# Patient Record
Sex: MALE | Race: WHITE | NOT HISPANIC OR LATINO | Employment: OTHER | URBAN - METROPOLITAN AREA
[De-identification: names, ages, dates, MRNs, and addresses within clinical notes are randomized per-mention and may not be internally consistent; named-entity substitution may affect disease eponyms.]

---

## 2023-10-19 PROBLEM — L30.9 DERMATITIS, UNSPECIFIED: Status: ACTIVE | Noted: 2023-05-11

## 2023-10-19 PROBLEM — M54.16 LUMBAR RADICULITIS: Status: ACTIVE | Noted: 2023-10-19

## 2023-10-19 PROBLEM — L81.4 OTHER MELANIN HYPERPIGMENTATION: Status: ACTIVE | Noted: 2023-05-11

## 2023-10-19 PROBLEM — M51.379 DEGENERATION OF INTERVERTEBRAL DISC OF LUMBOSACRAL REGION: Status: ACTIVE | Noted: 2023-10-19

## 2023-10-19 PROBLEM — M99.09 SEGMENTAL AND SOMATIC DYSFUNCTION: Status: ACTIVE | Noted: 2023-10-19

## 2023-10-19 PROBLEM — L57.0 ACTINIC KERATOSIS: Status: ACTIVE | Noted: 2023-05-11

## 2023-10-19 PROBLEM — D22.5 MELANOCYTIC NEVI OF TRUNK: Status: ACTIVE | Noted: 2023-05-11

## 2023-10-19 PROBLEM — M51.16 LUMBAR DISC DISEASE WITH RADICULOPATHY: Status: ACTIVE | Noted: 2023-10-19

## 2023-10-19 PROBLEM — D23.5 OTHER BENIGN NEOPLASM OF SKIN OF TRUNK: Status: ACTIVE | Noted: 2023-05-11

## 2023-10-19 PROBLEM — D22.62 MELANOCYTIC NEVI OF LEFT UPPER LIMB, INCLUDING SHOULDER: Status: ACTIVE | Noted: 2023-05-11

## 2023-10-19 PROBLEM — M51.37 DEGENERATION OF INTERVERTEBRAL DISC OF LUMBOSACRAL REGION: Status: ACTIVE | Noted: 2023-10-19

## 2023-10-19 PROBLEM — C44.329 SQUAMOUS CELL CARCINOMA OF SKIN OF OTHER PARTS OF FACE: Status: ACTIVE | Noted: 2023-05-11

## 2023-10-19 PROBLEM — L21.9 SEBORRHEIC DERMATITIS, UNSPECIFIED: Status: ACTIVE | Noted: 2023-05-11

## 2023-10-19 PROBLEM — L71.9 ROSACEA: Status: ACTIVE | Noted: 2023-05-11

## 2023-10-19 PROBLEM — L82.1 OTHER SEBORRHEIC KERATOSIS: Status: ACTIVE | Noted: 2023-05-11

## 2023-10-19 PROBLEM — L82.0 INFLAMED SEBORRHEIC KERATOSIS: Status: ACTIVE | Noted: 2023-05-11

## 2023-10-19 RX ORDER — AMMONIUM LACTATE 12 G/100G
LOTION TOPICAL
COMMUNITY
Start: 2022-11-14 | End: 2023-10-23 | Stop reason: SDUPTHER

## 2023-10-19 RX ORDER — KETOCONAZOLE 20 MG/G
CREAM TOPICAL
COMMUNITY
Start: 2019-01-25

## 2023-10-19 RX ORDER — FLUOCINONIDE 0.5 MG/G
CREAM TOPICAL
COMMUNITY
Start: 2019-04-18 | End: 2023-10-23 | Stop reason: SDUPTHER

## 2023-10-19 RX ORDER — CHOLECALCIFEROL (VITAMIN D3) 125 MCG
1 TABLET ORAL DAILY
COMMUNITY

## 2023-10-19 RX ORDER — CALCIPOTRIENE 0.005% / CLOBETASOL PROPIONATE 0.05% .005; .05 G/100G; G/100G
SOLUTION TOPICAL
COMMUNITY
Start: 2019-12-09

## 2023-10-19 RX ORDER — MAGNESIUM 250 MG
1 TABLET ORAL DAILY
COMMUNITY

## 2023-10-19 RX ORDER — ASPIRIN 81 MG/1
1 TABLET ORAL DAILY
COMMUNITY

## 2023-10-19 RX ORDER — CLOBETASOL PROPIONATE 0.05 G/ML
SPRAY TOPICAL
COMMUNITY
Start: 2016-08-26

## 2023-10-19 RX ORDER — BISOPROLOL FUMARATE AND HYDROCHLOROTHIAZIDE 10; 6.25 MG/1; MG/1
1 TABLET ORAL DAILY
COMMUNITY

## 2023-10-19 RX ORDER — IVERMECTIN 10 MG/G
CREAM TOPICAL
COMMUNITY
Start: 2016-06-17

## 2023-10-19 RX ORDER — EPINEPHRINE 0.22MG
AEROSOL WITH ADAPTER (ML) INHALATION
COMMUNITY

## 2023-10-19 RX ORDER — METHYLPHENIDATE HYDROCHLORIDE 5 MG/1
1 TABLET ORAL DAILY
COMMUNITY

## 2023-10-19 RX ORDER — CICLOPIROX 1 G/100ML
SHAMPOO TOPICAL
COMMUNITY
Start: 2016-08-26 | End: 2023-10-23 | Stop reason: SDUPTHER

## 2023-10-19 RX ORDER — ASCORBIC ACID 250 MG
1 TABLET ORAL DAILY
COMMUNITY

## 2023-10-19 RX ORDER — ATORVASTATIN CALCIUM 40 MG/1
1 TABLET, FILM COATED ORAL NIGHTLY
COMMUNITY

## 2023-10-19 RX ORDER — AZELAIC ACID 0.15 G/G
AEROSOL, FOAM TOPICAL
COMMUNITY
Start: 2016-04-25

## 2023-10-19 RX ORDER — LANOLIN ALCOHOL/MO/W.PET/CERES
1 CREAM (GRAM) TOPICAL DAILY
COMMUNITY

## 2023-10-19 RX ORDER — PNV NO.95/FERROUS FUM/FOLIC AC 28MG-0.8MG
1 TABLET ORAL DAILY
COMMUNITY

## 2023-10-19 RX ORDER — BUPROPION HYDROCHLORIDE 300 MG/1
1 TABLET ORAL DAILY
COMMUNITY

## 2023-10-23 ENCOUNTER — OFFICE VISIT (OUTPATIENT)
Dept: DERMATOLOGY | Facility: CLINIC | Age: 73
End: 2023-10-23
Payer: COMMERCIAL

## 2023-10-23 DIAGNOSIS — L21.9 SEBORRHEIC DERMATITIS: ICD-10-CM

## 2023-10-23 DIAGNOSIS — D22.9 NEVUS: ICD-10-CM

## 2023-10-23 DIAGNOSIS — C44.719 BASAL CELL CARCINOMA (BCC) OF SKIN OF LEFT LOWER EXTREMITY INCLUDING HIP: ICD-10-CM

## 2023-10-23 PROCEDURE — 1125F AMNT PAIN NOTED PAIN PRSNT: CPT | Performed by: SPECIALIST

## 2023-10-23 PROCEDURE — 99214 OFFICE O/P EST MOD 30 MIN: CPT | Performed by: SPECIALIST

## 2023-10-23 PROCEDURE — 88312 SPECIAL STAINS GROUP 1: CPT | Performed by: DERMATOLOGY

## 2023-10-23 PROCEDURE — 11402 EXC TR-EXT B9+MARG 1.1-2 CM: CPT | Performed by: SPECIALIST

## 2023-10-23 PROCEDURE — 88305 TISSUE EXAM BY PATHOLOGIST: CPT | Mod: TC,DER | Performed by: SPECIALIST

## 2023-10-23 PROCEDURE — 88305 TISSUE EXAM BY PATHOLOGIST: CPT | Performed by: DERMATOLOGY

## 2023-10-23 RX ORDER — FLUOCINONIDE TOPICAL SOLUTION USP, 0.05% 0.5 MG/ML
SOLUTION TOPICAL 2 TIMES DAILY
Qty: 60 ML | Refills: 3 | Status: SHIPPED | OUTPATIENT
Start: 2023-10-23

## 2023-10-23 RX ORDER — CEFUROXIME AXETIL 250 MG/1
250 TABLET ORAL DAILY
Qty: 30 TABLET | Refills: 6 | Status: SHIPPED | OUTPATIENT
Start: 2023-10-23 | End: 2024-05-14 | Stop reason: SDUPTHER

## 2023-10-23 RX ORDER — CICLOPIROX 1 G/100ML
SHAMPOO TOPICAL
Qty: 120 ML | Refills: 6 | Status: SHIPPED | OUTPATIENT
Start: 2023-10-23

## 2023-10-23 RX ORDER — AMMONIUM LACTATE 12 G/100G
LOTION TOPICAL
Qty: 225 G | Refills: 11 | Status: SHIPPED | OUTPATIENT
Start: 2023-10-23

## 2023-10-23 NOTE — PROGRESS NOTES
Patient is here for a full body exam. Full body exam done in the presence of chaperone. Eyes:  Conjunctiva normal lids normal. Mouth and throat: Lips normal teeth normal gums normal.  Oropharynx is moist and normal. Neck: Neck is supple without masses. CV: Extremities are  normal without calf tenderness edema varicosities. Abdomen: Is no hepatosplenomegaly.  Lymphatic: Is no cervical axillary or inguinal lymphadenopathy. Extremities: Inspection palpation  of digits and nails is normal without clubbing or cyanosis. Neuro/psych: Orientation to time,  place, person situation is normal. Mood/affect is normal. Skin: Palpation of scalp is normal  inspection of Hair and scalp, eyebrows, face, and extremities normal. Inspection/palpation of  Head/face mild photo damage. Neck mild photo damage. Chest mild photo damage. Breasts are  normal axillary vaults are normal. Abdomen normal.   Genitalia normal groin normal, buttocks normal. Back mild photo damage. Right upper extremity photo damage. Left upper extremity photo damage. Right lower extremity photo damage. Left lower extremity normal. Inspection of eccrine apocrine glands of skin and subcutaneous tissues normal.    Subjective     Christos Houston is a 73 y.o. male who presents for the following: body exam and Body Exam .     Review of Systems:  No other skin or systemic complaints other than what is documented elsewhere in the note.    The following portions of the chart were reviewed this encounter and updated as appropriate:          Skin Cancer History  No skin cancer on file.      Specialty Problems          Dermatology Problems    Actinic keratosis    Dermatitis, unspecified    Inflamed seborrheic keratosis    Melanocytic nevi of left upper limb, including shoulder    Melanocytic nevi of trunk    Other benign neoplasm of skin of trunk    Other melanin hyperpigmentation    Other seborrheic keratosis    Rosacea    Seborrheic dermatitis, unspecified    Squamous cell  carcinoma of skin of other parts of face        Objective   Well appearing patient in no apparent distress; mood and affect are within normal limits.    A focused skin examination was performed. All findings within normal limits unless otherwise noted below.    Assessment/Plan   1. Nevus  Right Wrist - Posterior    Shave removal - Right Wrist - Posterior    Instrument used: #15 blade      Specimen 1 - Dermatopathology- DERM LAB  Differential Diagnosis: Dysplastic Nevus   Check Margins Yes/No?:    Comments:    Dermpath Lab: Routine Histopathology (formalin-fixed tissue)    2. Basal cell carcinoma (BCC) of skin of left lower extremity including hip (2)  Left Lower Leg - Posterior    Shave removal    Timeout: patient name, date of birth, surgical site, and procedure verified    Instrument used: #15 blade      Shave removal    Timeout: patient name, date of birth, surgical site, and procedure verified    Instrument used: #15 blade      Specimen 2 - Dermatopathology- DERM LAB  Differential Diagnosis: BCC vs ISK   Check Margins Yes/No?:    Comments:    Dermpath Lab: Routine Histopathology (formalin-fixed tissue)    Left Popliteal Fossa    Shave removal    Timeout: patient name, date of birth, surgical site, and procedure verified    Instrument used: #15 blade      Specimen 3 - Dermatopathology- DERM LAB  Differential Diagnosis: BCC vs ISK   Check Margins Yes/No?:    Comments:    Dermpath Lab: Routine Histopathology (formalin-fixed tissue)

## 2023-10-23 NOTE — PROGRESS NOTES
Subjective     Christos Houston is a 73 y.o. male who presents for the following: body exam and Body Exam .     Review of Systems:  No other skin or systemic complaints other than what is documented elsewhere in the note.    The following portions of the chart were reviewed this encounter and updated as appropriate:            Specialty Problems          Dermatology Problems    Actinic keratosis    Dermatitis, unspecified    Inflamed seborrheic keratosis    Melanocytic nevi of left upper limb, including shoulder    Melanocytic nevi of trunk    Other benign neoplasm of skin of trunk    Other melanin hyperpigmentation    Other seborrheic keratosis    Rosacea    Seborrheic dermatitis, unspecified    Squamous cell carcinoma of skin of other parts of face        Objective   Well appearing patient in no apparent distress; mood and affect are within normal limits.    A focused skin examination was performed. All findings within normal limits unless otherwise noted below.    Assessment/Plan   1. Nevus  Right Wrist - Posterior    Shave removal - Right Wrist - Posterior    Instrument used: #15 blade      Specimen 1 - Dermatopathology- DERM LAB  Differential Diagnosis: Dysplastic Nevus   Check Margins Yes/No?:    Comments:    Dermpath Lab: Routine Histopathology (formalin-fixed tissue)    2. Basal cell carcinoma (BCC) of skin of left lower extremity including hip (2)  Left Lower Leg - Posterior    Shave removal    Timeout: patient name, date of birth, surgical site, and procedure verified    Instrument used: #15 blade      Shave removal    Timeout: patient name, date of birth, surgical site, and procedure verified    Instrument used: #15 blade      Specimen 2 - Dermatopathology- DERM LAB  Differential Diagnosis: BCC vs ISK   Check Margins Yes/No?:    Comments:    Dermpath Lab: Routine Histopathology (formalin-fixed tissue)    Left Popliteal Fossa    Shave removal    Timeout: patient name, date of birth, surgical site, and  procedure verified    Instrument used: #15 blade      Specimen 3 - Dermatopathology- DERM LAB  Differential Diagnosis: BCC vs ISK   Check Margins Yes/No?:    Comments:    Dermpath Lab: Routine Histopathology (formalin-fixed tissue)    3. Seborrheic dermatitis        Assessment/Plan: Patient patient has 2 irregularly elevated inflamed lesions in photodamaged area involving his right lower leg.  Diagnostic considerations would include irritated seborrheic keratoses or squamous cell carcinomas.    Patient is here for a full body exam. Full body exam done in the presence of chaperone.     Eyes: Conjunctiva normal lids normal. Mouth and throat: Lips normal teeth normal gums normal.  Oropharynx is moist and normal.   Neck: Neck is supple without masses.   CV: Extremities are  normal without calf tenderness edema varicosities.   Abdomen: Is no hepatosplenomegaly.  Lymphatic: Is no cervical axillary or inguinal lymphadenopathy.   Extremities: Inspection palpation  of digits and nails is normal without clubbing or cyanosis.   Neuro/psych: Orientation to time,  place, person situation is normal.   Mood/affect is normal.   Skin: Palpation of scalp is normal  inspection of Hair and scalp, eyebrows, face, and extremities normal. Inspection/palpation of  Head/face mild photo damage. Neck mild photo damage. Chest mild photo damage. Breasts are  normal axillary vaults are normal. Abdomen normal.   Genitalia normal groin normal, buttocks normal. Back mild photo damage. Right upper extremity photo damage. Left upper extremity photo damage. Right lower extremity photo damage. Left lower extremity normal. Inspection of eccrine apocrine glands of skin and subcutaneous tissues normal.

## 2023-10-26 LAB
LABORATORY COMMENT REPORT: NORMAL
PATH REPORT.FINAL DX SPEC: NORMAL
PATH REPORT.GROSS SPEC: NORMAL
PATH REPORT.MICROSCOPIC SPEC OTHER STN: NORMAL
PATH REPORT.RELEVANT HX SPEC: NORMAL
PATH REPORT.TOTAL CANCER: NORMAL

## 2023-12-08 DIAGNOSIS — L71.9 ROSACEA: Primary | ICD-10-CM

## 2023-12-11 RX ORDER — CEFUROXIME AXETIL 250 MG/1
250 TABLET ORAL 2 TIMES DAILY
Qty: 180 TABLET | Refills: 0 | Status: SHIPPED | OUTPATIENT
Start: 2023-12-11 | End: 2024-03-10

## 2024-02-15 ENCOUNTER — OFFICE VISIT (OUTPATIENT)
Dept: DERMATOLOGY | Facility: CLINIC | Age: 74
End: 2024-02-15
Payer: COMMERCIAL

## 2024-02-15 DIAGNOSIS — L82.1 SEBORRHEIC KERATOSIS: ICD-10-CM

## 2024-02-15 PROCEDURE — 11442 EXC FACE-MM B9+MARG 1.1-2 CM: CPT | Performed by: SPECIALIST

## 2024-02-15 PROCEDURE — 88305 TISSUE EXAM BY PATHOLOGIST: CPT | Performed by: DERMATOLOGY

## 2024-02-15 PROCEDURE — 11402 EXC TR-EXT B9+MARG 1.1-2 CM: CPT | Performed by: SPECIALIST

## 2024-02-15 PROCEDURE — 1159F MED LIST DOCD IN RCRD: CPT | Performed by: SPECIALIST

## 2024-02-15 PROCEDURE — 1125F AMNT PAIN NOTED PAIN PRSNT: CPT | Performed by: SPECIALIST

## 2024-02-15 NOTE — PROGRESS NOTES
Subjective   HPI: Christos Houston is a 73 y.o. male is here for evaluation and treatment of changing spots on my face and right chest.    ROS: No other skin or systemic complaints other than what is documented elsewhere in the note.    ALLERGIES: Neosporin ([id number]) [[id number]-[id number]-[id number]] and Sulfa (sulfonamide antibiotics)    SOCIAL:  has no history on file for tobacco use, alcohol use, and drug use.    Objective     Description: Patient has 2 irritated crusted lesions in photodamaged areas.  These involve the left cheek and right chest.  Diagnostic considerations include irritated seborrheic keratoses or squamous cell carcinoma.    Assessment/Plan   1. Seborrheic keratosis (2)  Right Breast; Left Anterior Mandible    Specimen 1 - Dermatopathology- DERM LAB  Differential Diagnosis: ISK vs SCC   Check Margins Yes/No?:    Comments:    Dermpath Lab: Routine Histopathology (formalin-fixed tissue)    Specimen 2 - Dermatopathology- DERM LAB  Differential Diagnosis: ISK vs SCC   Check Margins Yes/No?:    Comments:    Dermpath Lab: Routine Histopathology (formalin-fixed tissue)       Plan: Shave excision of both lesions.    FOLLOW UP: Patient can call for biopsy results.    The patient was encouraged to contact me with any further questions or concerns.  Hayder Aguila MD  2/15/2024

## 2024-03-12 ENCOUNTER — TELEPHONE (OUTPATIENT)
Dept: DERMATOLOGY | Facility: CLINIC | Age: 74
End: 2024-03-12
Payer: COMMERCIAL

## 2024-03-25 ENCOUNTER — APPOINTMENT (OUTPATIENT)
Dept: DERMATOLOGY | Facility: CLINIC | Age: 74
End: 2024-03-25
Payer: COMMERCIAL

## 2024-05-14 DIAGNOSIS — L71.9 ROSACEA: ICD-10-CM

## 2024-05-14 DIAGNOSIS — L21.9 SEBORRHEIC DERMATITIS: ICD-10-CM

## 2024-05-14 RX ORDER — CEFUROXIME AXETIL 250 MG/1
250 TABLET ORAL 2 TIMES DAILY
Qty: 60 TABLET | Refills: 6 | Status: SHIPPED | OUTPATIENT
Start: 2024-05-14 | End: 2024-12-10

## 2024-08-26 ENCOUNTER — APPOINTMENT (OUTPATIENT)
Dept: DERMATOLOGY | Facility: CLINIC | Age: 74
End: 2024-08-26